# Patient Record
Sex: FEMALE | Race: WHITE | Employment: FULL TIME | URBAN - METROPOLITAN AREA
[De-identification: names, ages, dates, MRNs, and addresses within clinical notes are randomized per-mention and may not be internally consistent; named-entity substitution may affect disease eponyms.]

---

## 2018-08-02 ENCOUNTER — OFFICE VISIT (OUTPATIENT)
Dept: ORTHOPEDIC SURGERY | Age: 23
End: 2018-08-02

## 2018-08-02 VITALS
SYSTOLIC BLOOD PRESSURE: 128 MMHG | BODY MASS INDEX: 29.23 KG/M2 | HEIGHT: 63 IN | DIASTOLIC BLOOD PRESSURE: 81 MMHG | HEART RATE: 68 BPM | WEIGHT: 165 LBS

## 2018-08-02 DIAGNOSIS — L03.011 CELLULITIS OF FINGER OF RIGHT HAND: ICD-10-CM

## 2018-08-02 DIAGNOSIS — M65.331 TRIGGER FINGER, RIGHT MIDDLE FINGER: ICD-10-CM

## 2018-08-02 PROCEDURE — 99214 OFFICE O/P EST MOD 30 MIN: CPT | Performed by: ORTHOPAEDIC SURGERY

## 2018-08-02 RX ORDER — NAPROXEN 500 MG/1
500 TABLET ORAL 2 TIMES DAILY WITH MEALS
Qty: 60 TABLET | Refills: 3 | Status: SHIPPED | OUTPATIENT
Start: 2018-08-02 | End: 2018-10-11 | Stop reason: SDUPTHER

## 2018-08-02 NOTE — PROGRESS NOTES
TESTING:        IMPRESSION AND PLAN: Resolving cellulitis from cat bite infection but what appears to be posttraumatic and post inflammatory trigger digit. We talked about treatment options for this issue, and I certainly do not have a heightened degree of concern for a tendon laceration. However, I have recommended starting simple with an anti-inflammatory to see if this can calm some of the friction within the tendon system. I will plan to see her back in 2-3 weeks if she fails to see improvement, at which point I think would be more reasonable and safe to consider a cortisone injection in this region. Please note that this transcription was created using voice recognition software. Any errors are unintentional and may be due to voice recognition transcription.

## 2018-10-11 RX ORDER — NAPROXEN 500 MG/1
500 TABLET ORAL 2 TIMES DAILY WITH MEALS
Qty: 180 TABLET | Refills: 2 | Status: SHIPPED | OUTPATIENT
Start: 2018-10-11 | End: 2020-12-29

## 2020-12-29 ENCOUNTER — HOSPITAL ENCOUNTER (EMERGENCY)
Age: 25
Discharge: HOME | End: 2020-12-29
Attending: EMERGENCY MEDICINE
Payer: COMMERCIAL

## 2020-12-29 VITALS
DIASTOLIC BLOOD PRESSURE: 78 MMHG | RESPIRATION RATE: 17 BRPM | HEART RATE: 113 BPM | SYSTOLIC BLOOD PRESSURE: 126 MMHG | WEIGHT: 180 LBS | TEMPERATURE: 100.6 F | OXYGEN SATURATION: 98 % | HEIGHT: 63 IN | BODY MASS INDEX: 31.89 KG/M2

## 2020-12-29 PROCEDURE — 6370000000 HC RX 637 (ALT 250 FOR IP): Performed by: EMERGENCY MEDICINE

## 2020-12-29 PROCEDURE — 2580000003 HC RX 258: Performed by: EMERGENCY MEDICINE

## 2020-12-29 PROCEDURE — 99284 EMERGENCY DEPT VISIT MOD MDM: CPT

## 2020-12-29 RX ORDER — LORAZEPAM 1 MG/1
0.5 TABLET ORAL ONCE
Status: COMPLETED | OUTPATIENT
Start: 2020-12-29 | End: 2020-12-29

## 2020-12-29 RX ORDER — 0.9 % SODIUM CHLORIDE 0.9 %
1000 INTRAVENOUS SOLUTION INTRAVENOUS ONCE
Status: COMPLETED | OUTPATIENT
Start: 2020-12-29 | End: 2020-12-29

## 2020-12-29 RX ORDER — IBUPROFEN 400 MG/1
400 TABLET ORAL ONCE
Status: COMPLETED | OUTPATIENT
Start: 2020-12-29 | End: 2020-12-29

## 2020-12-29 RX ADMIN — SODIUM CHLORIDE 1000 ML: 9 INJECTION, SOLUTION INTRAVENOUS at 04:39

## 2020-12-29 RX ADMIN — LORAZEPAM 0.5 MG: 1 TABLET ORAL at 04:41

## 2020-12-29 RX ADMIN — IBUPROFEN 400 MG: 400 TABLET, FILM COATED ORAL at 05:32

## 2020-12-29 ASSESSMENT — PAIN SCALES - GENERAL: PAINLEVEL_OUTOF10: 7

## 2020-12-29 NOTE — ED PROVIDER NOTES
Emergency Department Attending Note    uSha Nunez MD    Date of ED VIsit: 12/29/2020    CHIEF COMPLAINT  Allergic Reaction (Sts \"had the covid vaccine yesterday morning, heart feels like it's pounding out of my chest, fever\")      HISTORY OF PRESENT ILLNESS  Phoenix Faust is a 30 y.o. female  With Vital signs of /80   Pulse 124   Temp 100.6 °F (38.1 °C) (Oral)   Resp 18   Ht 5' 3\" (1.6 m)   Wt 180 lb (81.6 kg)   LMP 12/29/2020 (Exact Date)   SpO2 98%   Breastfeeding No   BMI 31.89 kg/m²  who presents to the ED with a complaint of feeling anxious. Patient seen and evaluated in room 7. Patient got her Moderna vaccine 2 this morning and has pain in her right arm which is expected to be expected there is no redness at the injection site. But she was concerned and got anxious that she was having a reaction to the vaccine and now she feels like her heart is jumping out of her chest and her heart rate when I walked in the room is 104 and as she started talking to me went up to 124 which is telling me that she is probably more anxious about this issue then a reaction to the vaccine. I do not believe this to be an allergic reaction to the vaccine. There is no other rashes no mouth or throat closing. No other complaints, modifying factors or associated symptoms. Patients Past medical history reviewed and listed below  Past Medical History:   Diagnosis Date    C. difficile colitis      History reviewed. No pertinent surgical history. I have reviewed the following from the nursing documentation. History reviewed. No pertinent family history.   Social History     Socioeconomic History    Marital status:      Spouse name: Not on file    Number of children: Not on file    Years of education: Not on file    Highest education level: Not on file   Occupational History    Not on file   Social Needs    Financial resource strain: Not on file    Food insecurity     Worry: Not on file Inability: Not on file    Transportation needs     Medical: Not on file     Non-medical: Not on file   Tobacco Use    Smoking status: Never Smoker    Smokeless tobacco: Never Used   Substance and Sexual Activity    Alcohol use: No    Drug use: No    Sexual activity: Yes     Partners: Male   Lifestyle    Physical activity     Days per week: Not on file     Minutes per session: Not on file    Stress: Not on file   Relationships    Social connections     Talks on phone: Not on file     Gets together: Not on file     Attends Bahai service: Not on file     Active member of club or organization: Not on file     Attends meetings of clubs or organizations: Not on file     Relationship status: Not on file    Intimate partner violence     Fear of current or ex partner: Not on file     Emotionally abused: Not on file     Physically abused: Not on file     Forced sexual activity: Not on file   Other Topics Concern    Not on file   Social History Narrative    Not on file     Current Facility-Administered Medications   Medication Dose Route Frequency Provider Last Rate Last Admin    0.9 % sodium chloride bolus  1,000 mL Intravenous Once Rasta Bourgeois MD        LORazepam (ATIVAN) tablet 0.5 mg  0.5 mg Oral Once Rasta Bourgeois MD         Current Outpatient Medications   Medication Sig Dispense Refill    NUVARING 0.12-0.015 MG/24HR vaginal ring   3    zolpidem (AMBIEN CR) 6.25 MG extended release tablet 12.5 mg.   0     No Known Allergies    REVIEW OF SYSTEMS  10 systems reviewed, pertinent positives per HPI otherwise noted to be negative    PHYSICAL EXAM  /80   Pulse 124   Temp 100.6 °F (38.1 °C) (Oral)   Resp 18   Ht 5' 3\" (1.6 m)   Wt 180 lb (81.6 kg)   LMP 12/29/2020 (Exact Date)   SpO2 98%   Breastfeeding No   BMI 31.89 kg/m²   GENERAL APPEARANCE: Awake and alert. Cooperative. In no obvious distress. HEAD: Normocephalic. Atraumatic. EYES: PERRL. EOM's grossly intact.    ENT: Mucous membranes are pink and moist.   NECK: Supple. HEART: RRR. No murmurs. LUNGS: Respirations unlabored. CTAB. Good air exchange. ABDOMEN: Soft. Non-distended. Non-tender. No masses. No organomegaly. No guarding or rebound. EXTREMITIES: No peripheral edema. Moves all extremities equally. All extremities neurovascularly intact. SKIN: Warm and dry. No acute rashes. NEUROLOGICAL: Alert and oriented. Strength 5/5, sensation intact. Gait normal.   PSYCHIATRIC: Normal mood and affect. No HI or SI expressed to me. RADIOLOGY    If acquired see below     EKG:     If acquired see below       ED COURSE/MDM    Patient will be administered a liter of normal saline as well as half of milligram of Ativan to get her heart rate down and get her anxiety under control. Thereafter she will be discharged to be followed up with her primary care doctor who can then recommend to her whether to get the second shot of the vaccine    ED Course as of Dec 29 0543   Tue Dec 29, 2020   0542 Patient was given Motrin for her slight fever which is probably a response to her Covid vaccine earlier today. [DL]      ED Course User Index  [DL] Dereck Duong MD       The ED course and plan were reviewed and results discussed with the patient    The patient understood and agreed with the Discharge/transfer planning.     CLINICAL IMPRESSION and DISPOSITION    Stephen Cee was stable and diagnosed with anxiety    Patient was treated with normal saline and Ativan       Dereck Duong MD  12/29/20 0497       Dereck Duong MD  12/29/20 6314

## 2021-06-30 LAB
C. TRACHOMATIS, EXTERNAL RESULT: NEGATIVE
N. GONORRHOEAE, EXTERNAL RESULT: NEGATIVE

## 2021-07-16 LAB
ABO, EXTERNAL RESULT: NORMAL
HEP B, EXTERNAL RESULT: NEGATIVE
HEPATITIS C ANTIBODY, EXTERNAL RESULT: NEGATIVE
HIV, EXTERNAL RESULT: NON REACTIVE
RH FACTOR, EXTERNAL RESULT: POSITIVE
RPR, EXTERNAL RESULT: NEGATIVE
RUBELLA TITER, EXTERNAL RESULT: NORMAL

## 2022-01-05 LAB — GBS, EXTERNAL RESULT: NEGATIVE

## 2022-02-04 ENCOUNTER — ANESTHESIA (OUTPATIENT)
Dept: LABOR AND DELIVERY | Age: 27
End: 2022-02-04
Payer: COMMERCIAL

## 2022-02-04 ENCOUNTER — ANESTHESIA EVENT (OUTPATIENT)
Dept: LABOR AND DELIVERY | Age: 27
End: 2022-02-04
Payer: COMMERCIAL

## 2022-02-04 ENCOUNTER — HOSPITAL ENCOUNTER (INPATIENT)
Age: 27
LOS: 2 days | Discharge: HOME | End: 2022-02-06
Attending: OBSTETRICS & GYNECOLOGY | Admitting: OBSTETRICS & GYNECOLOGY
Payer: COMMERCIAL

## 2022-02-04 PROBLEM — Z37.9 NORMAL LABOR: Status: ACTIVE | Noted: 2022-02-04

## 2022-02-04 LAB
A/G RATIO: 1.3 (ref 1.1–2.2)
ABO/RH: NORMAL
ALBUMIN SERPL-MCNC: 3.5 G/DL (ref 3.4–5)
ALP BLD-CCNC: 219 U/L (ref 40–129)
ALT SERPL-CCNC: 14 U/L (ref 10–40)
AMPHETAMINE SCREEN, URINE: NORMAL
ANION GAP SERPL CALCULATED.3IONS-SCNC: 15 MMOL/L (ref 3–16)
ANTIBODY SCREEN: NORMAL
AST SERPL-CCNC: 18 U/L (ref 15–37)
BARBITURATE SCREEN URINE: NORMAL
BASOPHILS ABSOLUTE: 0 K/UL (ref 0–0.2)
BASOPHILS RELATIVE PERCENT: 0.3 %
BENZODIAZEPINE SCREEN, URINE: NORMAL
BILIRUB SERPL-MCNC: <0.2 MG/DL (ref 0–1)
BUN BLDV-MCNC: 13 MG/DL (ref 7–20)
BUPRENORPHINE URINE: NORMAL
CALCIUM SERPL-MCNC: 8.5 MG/DL (ref 8.3–10.6)
CANNABINOID SCREEN URINE: NORMAL
CHLORIDE BLD-SCNC: 103 MMOL/L (ref 99–110)
CO2: 16 MMOL/L (ref 21–32)
COCAINE METABOLITE SCREEN URINE: NORMAL
CREAT SERPL-MCNC: 0.7 MG/DL (ref 0.6–1.1)
CREATININE URINE: 106.1 MG/DL (ref 28–259)
EOSINOPHILS ABSOLUTE: 0.1 K/UL (ref 0–0.6)
EOSINOPHILS RELATIVE PERCENT: 1.1 %
GFR AFRICAN AMERICAN: >60
GFR NON-AFRICAN AMERICAN: >60
GLUCOSE BLD-MCNC: 76 MG/DL (ref 70–99)
HCT VFR BLD CALC: 37.3 % (ref 36–48)
HEMOGLOBIN: 12.5 G/DL (ref 12–16)
LYMPHOCYTES ABSOLUTE: 2.6 K/UL (ref 1–5.1)
LYMPHOCYTES RELATIVE PERCENT: 19.7 %
Lab: NORMAL
MCH RBC QN AUTO: 29.5 PG (ref 26–34)
MCHC RBC AUTO-ENTMCNC: 33.6 G/DL (ref 31–36)
MCV RBC AUTO: 88 FL (ref 80–100)
METHADONE SCREEN, URINE: NORMAL
MONOCYTES ABSOLUTE: 0.7 K/UL (ref 0–1.3)
MONOCYTES RELATIVE PERCENT: 5.2 %
NEUTROPHILS ABSOLUTE: 9.8 K/UL (ref 1.7–7.7)
NEUTROPHILS RELATIVE PERCENT: 73.7 %
OPIATE SCREEN URINE: NORMAL
OXYCODONE URINE: NORMAL
PDW BLD-RTO: 12.9 % (ref 12.4–15.4)
PH UA: 7
PHENCYCLIDINE SCREEN URINE: NORMAL
PLATELET # BLD: 208 K/UL (ref 135–450)
PMV BLD AUTO: 9.8 FL (ref 5–10.5)
POTASSIUM SERPL-SCNC: 4.2 MMOL/L (ref 3.5–5.1)
PROPOXYPHENE SCREEN: NORMAL
PROTEIN PROTEIN: 15 MG/DL
PROTEIN/CREAT RATIO: 0.1 MG/DL
RBC # BLD: 4.24 M/UL (ref 4–5.2)
SODIUM BLD-SCNC: 134 MMOL/L (ref 136–145)
TOTAL PROTEIN: 6.1 G/DL (ref 6.4–8.2)
WBC # BLD: 13.3 K/UL (ref 4–11)

## 2022-02-04 PROCEDURE — 6370000000 HC RX 637 (ALT 250 FOR IP): Performed by: ADVANCED PRACTICE MIDWIFE

## 2022-02-04 PROCEDURE — 7200000001 HC VAGINAL DELIVERY

## 2022-02-04 PROCEDURE — 0HQ9XZZ REPAIR PERINEUM SKIN, EXTERNAL APPROACH: ICD-10-PCS | Performed by: ADVANCED PRACTICE MIDWIFE

## 2022-02-04 PROCEDURE — 85025 COMPLETE CBC W/AUTO DIFF WBC: CPT

## 2022-02-04 PROCEDURE — 3700000025 EPIDURAL BLOCK: Performed by: ANESTHESIOLOGY

## 2022-02-04 PROCEDURE — 1220000000 HC SEMI PRIVATE OB R&B

## 2022-02-04 PROCEDURE — 6370000000 HC RX 637 (ALT 250 FOR IP)

## 2022-02-04 PROCEDURE — 86900 BLOOD TYPING SEROLOGIC ABO: CPT

## 2022-02-04 PROCEDURE — 86592 SYPHILIS TEST NON-TREP QUAL: CPT

## 2022-02-04 PROCEDURE — 80053 COMPREHEN METABOLIC PANEL: CPT

## 2022-02-04 PROCEDURE — 36415 COLL VENOUS BLD VENIPUNCTURE: CPT

## 2022-02-04 PROCEDURE — 51701 INSERT BLADDER CATHETER: CPT

## 2022-02-04 PROCEDURE — 86901 BLOOD TYPING SEROLOGIC RH(D): CPT

## 2022-02-04 PROCEDURE — 86850 RBC ANTIBODY SCREEN: CPT

## 2022-02-04 PROCEDURE — 2500000003 HC RX 250 WO HCPCS: Performed by: NURSE ANESTHETIST, CERTIFIED REGISTERED

## 2022-02-04 PROCEDURE — 80307 DRUG TEST PRSMV CHEM ANLYZR: CPT

## 2022-02-04 PROCEDURE — 6360000002 HC RX W HCPCS: Performed by: ADVANCED PRACTICE MIDWIFE

## 2022-02-04 PROCEDURE — 2580000003 HC RX 258: Performed by: ADVANCED PRACTICE MIDWIFE

## 2022-02-04 PROCEDURE — 82570 ASSAY OF URINE CREATININE: CPT

## 2022-02-04 PROCEDURE — 2580000003 HC RX 258

## 2022-02-04 PROCEDURE — 84156 ASSAY OF PROTEIN URINE: CPT

## 2022-02-04 RX ORDER — LANOLIN 100 %
OINTMENT (GRAM) TOPICAL PRN
Status: DISCONTINUED | OUTPATIENT
Start: 2022-02-04 | End: 2022-02-06 | Stop reason: HOSPADM

## 2022-02-04 RX ORDER — SODIUM CHLORIDE, SODIUM LACTATE, POTASSIUM CHLORIDE, CALCIUM CHLORIDE 600; 310; 30; 20 MG/100ML; MG/100ML; MG/100ML; MG/100ML
INJECTION, SOLUTION INTRAVENOUS CONTINUOUS
Status: DISCONTINUED | OUTPATIENT
Start: 2022-02-04 | End: 2022-02-06 | Stop reason: HOSPADM

## 2022-02-04 RX ORDER — IBUPROFEN 800 MG/1
800 TABLET ORAL EVERY 6 HOURS PRN
Status: DISCONTINUED | OUTPATIENT
Start: 2022-02-05 | End: 2022-02-06 | Stop reason: HOSPADM

## 2022-02-04 RX ORDER — SODIUM CHLORIDE 0.9 % (FLUSH) 0.9 %
5-40 SYRINGE (ML) INJECTION EVERY 12 HOURS SCHEDULED
Status: DISCONTINUED | OUTPATIENT
Start: 2022-02-04 | End: 2022-02-04

## 2022-02-04 RX ORDER — SODIUM CHLORIDE, SODIUM LACTATE, POTASSIUM CHLORIDE, AND CALCIUM CHLORIDE .6; .31; .03; .02 G/100ML; G/100ML; G/100ML; G/100ML
500 INJECTION, SOLUTION INTRAVENOUS PRN
Status: DISCONTINUED | OUTPATIENT
Start: 2022-02-04 | End: 2022-02-04

## 2022-02-04 RX ORDER — HYDROCODONE BITARTRATE AND ACETAMINOPHEN 5; 325 MG/1; MG/1
1 TABLET ORAL EVERY 4 HOURS PRN
Status: DISCONTINUED | OUTPATIENT
Start: 2022-02-04 | End: 2022-02-06 | Stop reason: HOSPADM

## 2022-02-04 RX ORDER — DOCUSATE SODIUM 100 MG/1
100 CAPSULE, LIQUID FILLED ORAL 2 TIMES DAILY
Status: DISCONTINUED | OUTPATIENT
Start: 2022-02-04 | End: 2022-02-06 | Stop reason: HOSPADM

## 2022-02-04 RX ORDER — NITROFURANTOIN 25; 75 MG/1; MG/1
100 CAPSULE ORAL 2 TIMES DAILY
Status: ON HOLD | COMMUNITY
End: 2022-02-05 | Stop reason: HOSPADM

## 2022-02-04 RX ORDER — SODIUM CHLORIDE 0.9 % (FLUSH) 0.9 %
5-40 SYRINGE (ML) INJECTION PRN
Status: DISCONTINUED | OUTPATIENT
Start: 2022-02-04 | End: 2022-02-06 | Stop reason: HOSPADM

## 2022-02-04 RX ORDER — FERROUS SULFATE 325(65) MG
325 TABLET ORAL 2 TIMES DAILY WITH MEALS
Status: DISCONTINUED | OUTPATIENT
Start: 2022-02-04 | End: 2022-02-06 | Stop reason: HOSPADM

## 2022-02-04 RX ORDER — BUPIVACAINE HYDROCHLORIDE 2.5 MG/ML
INJECTION, SOLUTION EPIDURAL; INFILTRATION; INTRACAUDAL PRN
Status: DISCONTINUED | OUTPATIENT
Start: 2022-02-04 | End: 2022-02-04 | Stop reason: SDUPTHER

## 2022-02-04 RX ORDER — SODIUM CHLORIDE 9 MG/ML
25 INJECTION, SOLUTION INTRAVENOUS PRN
Status: DISCONTINUED | OUTPATIENT
Start: 2022-02-04 | End: 2022-02-06 | Stop reason: HOSPADM

## 2022-02-04 RX ORDER — SODIUM CHLORIDE 9 MG/ML
25 INJECTION, SOLUTION INTRAVENOUS PRN
Status: DISCONTINUED | OUTPATIENT
Start: 2022-02-04 | End: 2022-02-04

## 2022-02-04 RX ORDER — SODIUM CHLORIDE, SODIUM LACTATE, POTASSIUM CHLORIDE, CALCIUM CHLORIDE 600; 310; 30; 20 MG/100ML; MG/100ML; MG/100ML; MG/100ML
INJECTION, SOLUTION INTRAVENOUS CONTINUOUS
Status: DISCONTINUED | OUTPATIENT
Start: 2022-02-04 | End: 2022-02-04

## 2022-02-04 RX ORDER — SODIUM CHLORIDE 0.9 % (FLUSH) 0.9 %
5-40 SYRINGE (ML) INJECTION EVERY 12 HOURS SCHEDULED
Status: DISCONTINUED | OUTPATIENT
Start: 2022-02-04 | End: 2022-02-06 | Stop reason: HOSPADM

## 2022-02-04 RX ORDER — CALCIUM CARBONATE 200(500)MG
1 TABLET,CHEWABLE ORAL DAILY
Status: ON HOLD | COMMUNITY
End: 2022-02-05 | Stop reason: HOSPADM

## 2022-02-04 RX ORDER — BUPIVACAINE HYDROCHLORIDE 5 MG/ML
INJECTION, SOLUTION EPIDURAL; INTRACAUDAL PRN
Status: DISCONTINUED | OUTPATIENT
Start: 2022-02-04 | End: 2022-02-04 | Stop reason: SDUPTHER

## 2022-02-04 RX ORDER — ACETAMINOPHEN 325 MG/1
650 TABLET ORAL EVERY 4 HOURS PRN
Status: DISCONTINUED | OUTPATIENT
Start: 2022-02-04 | End: 2022-02-06 | Stop reason: HOSPADM

## 2022-02-04 RX ORDER — SODIUM CHLORIDE, SODIUM LACTATE, POTASSIUM CHLORIDE, AND CALCIUM CHLORIDE .6; .31; .03; .02 G/100ML; G/100ML; G/100ML; G/100ML
1000 INJECTION, SOLUTION INTRAVENOUS PRN
Status: DISCONTINUED | OUTPATIENT
Start: 2022-02-04 | End: 2022-02-04

## 2022-02-04 RX ORDER — SODIUM CHLORIDE 0.9 % (FLUSH) 0.9 %
5-40 SYRINGE (ML) INJECTION PRN
Status: DISCONTINUED | OUTPATIENT
Start: 2022-02-04 | End: 2022-02-04

## 2022-02-04 RX ORDER — ONDANSETRON 2 MG/ML
4 INJECTION INTRAMUSCULAR; INTRAVENOUS EVERY 6 HOURS PRN
Status: DISCONTINUED | OUTPATIENT
Start: 2022-02-04 | End: 2022-02-04

## 2022-02-04 RX ORDER — IBUPROFEN 800 MG/1
TABLET ORAL
Status: COMPLETED
Start: 2022-02-04 | End: 2022-02-04

## 2022-02-04 RX ORDER — FAMOTIDINE 20 MG/1
20 TABLET, FILM COATED ORAL 2 TIMES DAILY
Status: ON HOLD | COMMUNITY
End: 2022-02-05 | Stop reason: HOSPADM

## 2022-02-04 RX ADMIN — Medication 1 MILLI-UNITS/MIN: at 08:15

## 2022-02-04 RX ADMIN — IBUPROFEN 800 MG: 800 TABLET, FILM COATED ORAL at 15:11

## 2022-02-04 RX ADMIN — SODIUM CHLORIDE, POTASSIUM CHLORIDE, SODIUM LACTATE AND CALCIUM CHLORIDE: 600; 310; 30; 20 INJECTION, SOLUTION INTRAVENOUS at 06:01

## 2022-02-04 RX ADMIN — WITCH HAZEL 40 EACH: 500 SOLUTION RECTAL; TOPICAL at 20:57

## 2022-02-04 RX ADMIN — ACETAMINOPHEN 650 MG: 325 TABLET ORAL at 20:57

## 2022-02-04 RX ADMIN — BUPIVACAINE HYDROCHLORIDE 5 ML: 2.5 INJECTION, SOLUTION EPIDURAL; INFILTRATION; INTRACAUDAL; PERINEURAL at 10:05

## 2022-02-04 RX ADMIN — SODIUM CHLORIDE, POTASSIUM CHLORIDE, SODIUM LACTATE AND CALCIUM CHLORIDE: 600; 310; 30; 20 INJECTION, SOLUTION INTRAVENOUS at 12:20

## 2022-02-04 RX ADMIN — Medication 15 ML/HR: at 07:06

## 2022-02-04 RX ADMIN — BENZOCAINE AND LEVOMENTHOL: 200; 5 SPRAY TOPICAL at 20:57

## 2022-02-04 RX ADMIN — BUPIVACAINE HYDROCHLORIDE 5 ML: 5 INJECTION, SOLUTION EPIDURAL; INTRACAUDAL; PERINEURAL at 10:05

## 2022-02-04 RX ADMIN — SODIUM CHLORIDE, PRESERVATIVE FREE 10 ML: 5 INJECTION INTRAVENOUS at 21:05

## 2022-02-04 RX ADMIN — IBUPROFEN 800 MG: 800 TABLET, FILM COATED ORAL at 23:30

## 2022-02-04 RX ADMIN — Medication 10 ML: at 21:05

## 2022-02-04 RX ADMIN — DOCUSATE SODIUM 100 MG: 100 CAPSULE, LIQUID FILLED ORAL at 20:57

## 2022-02-04 RX ADMIN — BUPIVACAINE HYDROCHLORIDE 7 ML: 2.5 INJECTION, SOLUTION EPIDURAL; INFILTRATION; INTRACAUDAL; PERINEURAL at 07:00

## 2022-02-04 ASSESSMENT — PAIN SCALES - GENERAL
PAINLEVEL_OUTOF10: 1
PAINLEVEL_OUTOF10: 3
PAINLEVEL_OUTOF10: 2

## 2022-02-04 ASSESSMENT — PAIN DESCRIPTION - DESCRIPTORS
DESCRIPTORS: CRAMPING
DESCRIPTORS: CRAMPING

## 2022-02-04 NOTE — PROGRESS NOTES
Patient presented to triage by life squad. Patient c/o ROM at 0230 with green fluid. No vaginal bleeding. Patient placed on monitor.

## 2022-02-04 NOTE — L&D DELIVERY NOTE
Department of Obstetrics and Gynecology  Spontaneous Vaginal Delivery Note    Labor & Delivery Summary  Labor Onset Date: 22  Labor Onset Time: 935  Dilation Complete Date: 22  Dilation Complete Time: 935  OB Anesthesia Type: Epidural  Induction:  (na)    Pre-operative Diagnosis:  Term pregnancy, post dates, meconium stained fluid    Post-operative Diagnosis:  Living  infant(s) and Female    Procedure:  Spontaneous vaginal delivery or Repair first degree spontaneous laceration    Surgeon:   Adma Fernández CNM    Information for the patient's :  Edith Sykes Mercury [[id number]]          Anesthesia:  epidural anesthesia    Estimated blood loss:  100 cc    Specimen:  Placenta not sent to pathology     Cord blood sent No    Complications:  none    Condition:  infant Stable and skin to skin with the mother and mother stable    Details of Procedure: The patient is a 27 y.o. female at 39w6d   OB History        1    Para   0    Term   0       0    AB   0    Living   0       SAB   0    IAB   0    Ectopic   0    Molar   0    Multiple   0    Live Births   0             who was admitted for spontaneous rupture of membranes/early labor. She received the following interventions: IV Pitocin augmentation at 2 miu x 1 hour. She was known to be GBS negative and did not receive antibiotic prophylaxis. The patient progressed well,did receive an epidural, became complete and started to push. After pushing for 20 minutes the fetal head was at the perineum and the rest of the infant delivered atraumatically and was placed on the mother's abdomen. The NNP was present for delivery. The infant established good color, tone and cry with stimulation. Cord was clamped and cut and infant was placed skin to skin with the mother. Apgar scores were 7-9. The delivery of the placenta was spontaneous. The perineum and vagina were explored and a first degree laceration was repaired in standard fashion.  This mother plans to bottlefeed her baby. Dr Nicky Powell was notified of this birth.

## 2022-02-04 NOTE — PROGRESS NOTES
This RN reviewed all documentation and supervised all care done by Konstantin Aleman, Student nurse.   Gay Cullen RN

## 2022-02-04 NOTE — ANESTHESIA PRE PROCEDURE
Department of Anesthesiology  Preprocedure Note       Name:  Sharona Ramos   Age:  27 y.o.  :  1995                                          MRN:  [id number]         Date:  2022      Surgeon: * No surgeons listed *    Procedure: * No procedures listed *    Medications prior to admission:   Prior to Admission medications    Medication Sig Start Date End Date Taking?  Authorizing Provider   hydrOXYzine Pamoate (VISTARIL PO) Take 25 mg by mouth 3 times daily as needed (for anxiety)   Yes Historical Provider, MD   nitrofurantoin, macrocrystal-monohydrate, (MACROBID) 100 MG capsule Take 100 mg by mouth 2 times daily   Yes Historical Provider, MD   calcium carbonate (TUMS) 500 MG chewable tablet Take 1 tablet by mouth daily   Yes Historical Provider, MD   Prenatal MV-Min-Fe Fum-FA-DHA (PRENATAL 1 PO) Take by mouth   Yes Historical Provider, MD   famotidine (PEPCID) 20 MG tablet Take 20 mg by mouth 2 times daily   Yes Historical Provider, MD   docusate (COLACE) 50 MG/5ML liquid Take 50 mg by mouth 2 times daily   Yes Historical Provider, MD       Current medications:    Current Facility-Administered Medications   Medication Dose Route Frequency Provider Last Rate Last Admin    lactated ringers infusion   IntraVENous Continuous Herlinda Paul, APRN -  mL/hr at 22 0601 New Bag at 22 0601    lactated ringers bolus  500 mL IntraVENous PRN Herlinda Paul, APRN - CNM        Or    lactated ringers bolus  1,000 mL IntraVENous PRN Herlinda Paul, APRN - CNM        sodium chloride flush 0.9 % injection 5-40 mL  5-40 mL IntraVENous 2 times per day Herlinda Paul, APRN - CNM        sodium chloride flush 0.9 % injection 5-40 mL  5-40 mL IntraVENous PRN Herlinda Paul, APRN - CNM        0.9 % sodium chloride infusion  25 mL IntraVENous PRN Herlinda Paul, APRN - CNM        ondansetron [facility] injection 4 mg  4 mg IntraVENous Q6H PRN Herlinda Paul, APRN - CNM        acetaminophen (TYLENOL) tablet 650 mg  650 mg Oral Q4H PRN Oral Conine, APRN - CNM        docusate sodium (COLACE) capsule 100 mg  100 mg Oral BID Oral Conine, APRN - CNM        sodium chloride 0.9 % 200 mL with fentaNYL 500 mcg, bupivacaine 0.5% 50 mL (OB) epidural                Allergies:  No Known Allergies    Problem List:    Patient Active Problem List   Diagnosis Code    Right wrist pain M25.531    Ganglion, right wrist M67.431    Cellulitis of finger of right hand L03.011    Trigger finger, right middle finger M65.331    Normal labor O80, Z37.9       Past Medical History:        Diagnosis Date    C. difficile colitis     Mental disorder     anxiety       Past Surgical History:  History reviewed. No pertinent surgical history. Social History:    Social History     Tobacco Use    Smoking status: Never Smoker    Smokeless tobacco: Never Used   Substance Use Topics    Alcohol use: No                                Counseling given: Not Answered      Vital Signs (Current):   Vitals:    02/04/22 0421 02/04/22 0459 02/04/22 0516 02/04/22 0600   BP: (!) 153/94 (!) 145/90 (!) 145/94 (!) 143/81   Pulse: 71 75 71 74   Resp:  16 18 20   Temp:    36.8 °C (98.3 °F)   TempSrc:    Oral   Weight:       Height:                                                  BP Readings from Last 3 Encounters:   02/04/22 (!) 143/81   12/29/20 126/78   08/02/18 128/81       NPO Status:                                                                                 BMI:   Wt Readings from Last 3 Encounters:   02/04/22 242 lb (109.8 kg)   12/29/20 180 lb (81.6 kg)   08/02/18 165 lb (74.8 kg)     Body mass index is 41.54 kg/m².     CBC:   Lab Results   Component Value Date    WBC 13.3 02/04/2022    RBC 4.24 02/04/2022    HGB 12.5 02/04/2022    HCT 37.3 02/04/2022    MCV 88.0 02/04/2022    RDW 12.9 02/04/2022     02/04/2022       CMP: No results found for: NA, K, CL, CO2, BUN, CREATININE, GFRAA, AGRATIO, LABGLOM, GLUCOSE, GLU, PROT, CALCIUM, BILITOT, ALKPHOS, AST, ALT    POC Tests: No results for input(s): POCGLU, POCNA, POCK, POCCL, POCBUN, POCHEMO, POCHCT in the last 72 hours. Coags: No results found for: PROTIME, INR, APTT    HCG (If Applicable): No results found for: PREGTESTUR, PREGSERUM, HCG, HCGQUANT     ABGs: No results found for: PHART, PO2ART, CJN1YSJ, SMU6NVI, BEART, S7FOGUFS     Type & Screen (If Applicable):  No results found for: LABABO, LABRH    Drug/Infectious Status (If Applicable):  No results found for: HIV, HEPCAB    COVID-19 Screening (If Applicable): No results found for: COVID19        Anesthesia Evaluation  Patient summary reviewed and Nursing notes reviewed no history of anesthetic complications:   Airway: Mallampati: II     Neck ROM: full   Dental: normal exam         Pulmonary:Negative Pulmonary ROS and normal exam                               Cardiovascular:Negative CV ROS                      Neuro/Psych:   Negative Neuro/Psych ROS  (+) depression/anxiety             GI/Hepatic/Renal: Neg GI/Hepatic/Renal ROS  (+) morbid obesity          Endo/Other: Negative Endo/Other ROS                    Abdominal:             Vascular: Other Findings:             Anesthesia Plan      epidural     ASA 2 - emergent     (I discussed with the patient the risks and benefits of labor epidural placement. All questions were answered the patient agrees with the plan. Recent Vitals:  ---------------------------               02/04/22                      0600         ---------------------------   BP:        (!) 143/81       Pulse:         74           Resp:          20           Temp:   36.8 °C (98.3 °F)  ---------------------------  Body mass index is 41.54 kg/m².     Social History    Tobacco Use      Smoking status: Never Smoker      Smokeless tobacco: Never Used      LABS:    CBC  Lab Results       Component                Value               Date/Time                  WBC                      13.3 (H) 02/04/2022 04:53 AM        HGB                      12.5                02/04/2022 04:53 AM        HCT                      37.3                02/04/2022 04:53 AM        PLT                      208                 02/04/2022 04:53 AM   RENAL  No results found for: NA, K, CL, CO2, BUN, CREATININE, GLUCOSE  COAGS  No results found for: PROTIME, INR, APTT)        Anesthetic plan and risks discussed with patient.                       WARREN Tyson - CRNA   2/4/2022

## 2022-02-04 NOTE — PROGRESS NOTES
Dr. Warden Wolf notified of patient arrival and that she is grossly ruptured.  Pt to stay for admission

## 2022-02-04 NOTE — PROGRESS NOTES
First time get up done; the pt tolerated well without any dizziness/lightheadedness, gait steady. Pt was able to void 600 ml without difficulty. Pt ambulated back to bed independently. No needs at this time, will continue to monitor.

## 2022-02-04 NOTE — H&P
Department of Obstetrics and Gynecology   Obstetrics History and Physical        CHIEF COMPLAINT:  leakage of amniotic fluid    HISTORY OF PRESENT ILLNESS:      The patient is a 27 y.o. female at 39w6d. OB History        1    Para   0    Term   0       0    AB   0    Living   0       SAB   0    IAB   0    Ectopic   0    Molar   0    Multiple   0    Live Births   0            Patient presents with a chief complaint as above and is being admitted for ROM. States ROM @ 0230 that was \"green. \" Called squad for transport to hospital d/t \"green fluid and weather. \" Was supposed to deliver at [address], but squad brought her to [facility]. Feeling irregular ctx. Denies VB, states +FM. States US @ 20 wks was normal.    Estimated Due Date: Estimated Date of Delivery: 22    PRENATAL CARE:    Complicated by: anxiety, Covid + 21, UTI - Macrobid    PAST OB HISTORY:  OB History        1    Para   0    Term   0       0    AB   0    Living   0       SAB   0    IAB   0    Ectopic   0    Molar   0    Multiple   0    Live Births   0                Past Medical History:        Diagnosis Date    C. difficile colitis     Mental disorder     anxiety     Past Surgical History:    History reviewed. No pertinent surgical history. Allergies:  Patient has no known allergies.     Social History:    Social History     Socioeconomic History    Marital status:      Spouse name: Not on file    Number of children: Not on file    Years of education: Not on file    Highest education level: Not on file   Occupational History    Not on file   Tobacco Use    Smoking status: Never Smoker    Smokeless tobacco: Never Used   Substance and Sexual Activity    Alcohol use: No    Drug use: No    Sexual activity: Yes     Partners: Male   Other Topics Concern    Not on file   Social History Narrative    Not on file     Social Determinants of Health     Financial Resource Strain:     Difficulty of Paying Living Expenses: Not on file   Food Insecurity:     Worried About Running Out of Food in the Last Year: Not on file    [place] of Food in the Last Year: Not on file   Transportation Needs:     Lack of Transportation (Medical): Not on file    Lack of Transportation (Non-Medical): Not on file   Physical Activity:     Days of Exercise per Week: Not on file    Minutes of Exercise per Session: Not on file   Stress:     Feeling of Stress : Not on file   Social Connections:     Frequency of Communication with Friends and Family: Not on file    Frequency of Social Gatherings with Friends and Family: Not on file    Attends Mormonism Services: Not on file    Active Member of [address] [place] [address] [employer] or Organizations: Not on file    Attends Club or Organization Meetings: Not on file    Marital Status: Not on file   Intimate Partner Violence:     Fear of Current or Ex-Partner: Not on file    Emotionally Abused: Not on file    Physically Abused: Not on file    Sexually Abused: Not on file   Housing Stability:     Unable to Pay for Housing in the Last Year: Not on file    Number of [place] in the Last Year: Not on file    Unstable Housing in the Last Year: Not on file     Family History:   History reviewed. No pertinent family history.   Medications Prior to Admission:  Medications Prior to Admission: hydrOXYzine Pamoate (VISTARIL PO), Take 25 mg by mouth 3 times daily as needed (for anxiety)  nitrofurantoin, macrocrystal-monohydrate, (MACROBID) 100 MG capsule, Take 100 mg by mouth 2 times daily  calcium carbonate (TUMS) 500 MG chewable tablet, Take 1 tablet by mouth daily  Prenatal MV-Min-Fe Fum-FA-DHA (PRENATAL 1 PO), Take by mouth  famotidine (PEPCID) 20 MG tablet, Take 20 mg by mouth 2 times daily  docusate (COLACE) 50 MG/5ML liquid, Take 50 mg by mouth 2 times daily  [DISCONTINUED] NUVARING 0.12-0.015 MG/24HR vaginal ring,   [DISCONTINUED] zolpidem (AMBIEN CR) 6.25 MG extended release tablet, 12.5 mg.     REVIEW OF SYSTEMS:    Review of Systems - Negative except irregular ctx      PHYSICAL EXAM:  Vitals:    22 0410 22 0421   BP: (!) 142/92 (!) 153/94   Pulse: 87 71   Resp: 18    Temp: 98 °F (36.7 °C)    TempSrc: Oral    Weight: 242 lb (109.8 kg)    Height: 5' 4\" (1.626 m)      General appearance: awake, alert, cooperative, no apparent distress, and appears stated age  Neurologic:  Awake, alert, oriented to name, place and time.     Lungs:  No increased work of breathing, good air exchange  Abdomen:  Soft, non tender, gravid, consistent with her gestational age, EFW by Leopold's maneuver was 8lbs   Fetal heart rate: 130, moderate variability w/ periods of minimal, +accels, no decels  Pelvis:  Adequate pelvis  Cervix: 1/80/-1 per RN @   Contraction frequency:  q4-6min  Membranes:  Ruptured - meconium stained    Labs: pending    ASSESSMENT AND PLAN:    27 y.o.  @ 40w5d for SROM and irregular ctx  Elevated bps on admission - asymptomatic, CMP to be collected, PCR w/ straight cath after epidural  GHTN vs anxiety  GBS negative per Care Everywhere  FHT: Cat 1  Dr. Kelle Hoyt will be notified of this admission  Continue to monitor  Anticipate vaginal delivery

## 2022-02-04 NOTE — PLAN OF CARE
Problem: Pain:  Description: Pain management should include both nonpharmacologic and pharmacologic interventions.   Goal: Pain level will decrease  Description: Pain level will decrease  Outcome: Completed  Goal: Control of acute pain  Description: Control of acute pain  Outcome: Completed  Goal: Control of chronic pain  Description: Control of chronic pain  Outcome: Completed     Problem: Anxiety:  Goal: Level of anxiety will decrease  Description: Level of anxiety will decrease  Outcome: Completed     Problem: Breathing Pattern - Ineffective:  Goal: Able to breathe comfortably  Description: Able to breathe comfortably  Outcome: Completed     Problem: Fluid Volume - Imbalance:  Goal: Absence of imbalanced fluid volume signs and symptoms  Description: Absence of imbalanced fluid volume signs and symptoms  Outcome: Completed  Goal: Absence of intrapartum hemorrhage signs and symptoms  Description: Absence of intrapartum hemorrhage signs and symptoms  Outcome: Completed     Problem: Infection - Intrapartum Infection:  Goal: Will show no infection signs and symptoms  Description: Will show no infection signs and symptoms  Outcome: Completed     Problem: Labor Process - Prolonged:  Goal: Labor progression, first stage, within specified pattern  Description: Labor progression, first stage, within specified pattern  Outcome: Completed  Goal: Labor progession, second stage, within specified pattern  Description: Labor progession, second stage, within specified pattern  Outcome: Completed  Goal: Uterine contractions within specified parameters  Description: Uterine contractions within specified parameters  Outcome: Completed     Problem:  Screening:  Goal: Ability to make informed decisions regarding treatment has improved  Description: Ability to make informed decisions regarding treatment has improved  Outcome: Completed     Problem: Pain - Acute:  Goal: Able to cope with pain  Description: Able to cope with pain  Outcome: Completed     Problem: Tissue Perfusion - Uteroplacental, Altered:  Description: [TRUNCATED] For intrapartum patients with recurrent variable decelerations of the fetal heart rate, consider transcervical amnioinfusion. For patients in labor, avoid prophylactic use of continuous maternal oxygen supplementation to prevent nonreassu . ..   Goal: Absence of abnormal fetal heart rate pattern  Description: Absence of abnormal fetal heart rate pattern  Outcome: Completed     Problem: Urinary Retention:  Goal: Experiences of bladder distention will decrease  Description: Experiences of bladder distention will decrease  Outcome: Completed  Goal: Urinary elimination within specified parameters  Description: Urinary elimination within specified parameters  Outcome: Completed

## 2022-02-05 PROBLEM — Z37.9 NORMAL LABOR: Status: ACTIVE | Noted: 2022-02-05

## 2022-02-05 LAB
HCT VFR BLD CALC: 33.2 % (ref 36–48)
HEMOGLOBIN: 11 G/DL (ref 12–16)
MCH RBC QN AUTO: 29.7 PG (ref 26–34)
MCHC RBC AUTO-ENTMCNC: 33.2 G/DL (ref 31–36)
MCV RBC AUTO: 89.5 FL (ref 80–100)
PDW BLD-RTO: 13 % (ref 12.4–15.4)
PLATELET # BLD: 168 K/UL (ref 135–450)
PMV BLD AUTO: 9.5 FL (ref 5–10.5)
RBC # BLD: 3.71 M/UL (ref 4–5.2)
RPR: NORMAL
WBC # BLD: 14.8 K/UL (ref 4–11)

## 2022-02-05 PROCEDURE — 6370000000 HC RX 637 (ALT 250 FOR IP): Performed by: OBSTETRICS & GYNECOLOGY

## 2022-02-05 PROCEDURE — 36415 COLL VENOUS BLD VENIPUNCTURE: CPT

## 2022-02-05 PROCEDURE — 6370000000 HC RX 637 (ALT 250 FOR IP): Performed by: ADVANCED PRACTICE MIDWIFE

## 2022-02-05 PROCEDURE — 85027 COMPLETE CBC AUTOMATED: CPT

## 2022-02-05 PROCEDURE — 6370000000 HC RX 637 (ALT 250 FOR IP)

## 2022-02-05 PROCEDURE — 1220000000 HC SEMI PRIVATE OB R&B

## 2022-02-05 RX ORDER — POLYETHYLENE GLYCOL 3350 17 G/17G
17 POWDER, FOR SOLUTION ORAL
Status: ACTIVE | OUTPATIENT
Start: 2022-02-05 | End: 2022-02-05

## 2022-02-05 RX ORDER — BISACODYL 10 MG
10 SUPPOSITORY, RECTAL RECTAL
Status: COMPLETED | OUTPATIENT
Start: 2022-02-05 | End: 2022-02-05

## 2022-02-05 RX ADMIN — DOCUSATE SODIUM 100 MG: 100 CAPSULE, LIQUID FILLED ORAL at 22:05

## 2022-02-05 RX ADMIN — DOCUSATE SODIUM 100 MG: 100 CAPSULE, LIQUID FILLED ORAL at 08:55

## 2022-02-05 RX ADMIN — BENZOCAINE AND LEVOMENTHOL: 200; 5 SPRAY TOPICAL at 08:55

## 2022-02-05 RX ADMIN — ACETAMINOPHEN 650 MG: 325 TABLET ORAL at 01:18

## 2022-02-05 RX ADMIN — IBUPROFEN 800 MG: 800 TABLET, FILM COATED ORAL at 13:33

## 2022-02-05 RX ADMIN — IBUPROFEN 800 MG: 800 TABLET, FILM COATED ORAL at 22:05

## 2022-02-05 RX ADMIN — WITCH HAZEL 40 EACH: 500 SOLUTION RECTAL; TOPICAL at 08:55

## 2022-02-05 RX ADMIN — BISACODYL 10 MG: 10 SUPPOSITORY RECTAL at 16:10

## 2022-02-05 RX ADMIN — IBUPROFEN 800 MG: 800 TABLET, FILM COATED ORAL at 05:32

## 2022-02-05 ASSESSMENT — PAIN SCALES - GENERAL
PAINLEVEL_OUTOF10: 4
PAINLEVEL_OUTOF10: 4
PAINLEVEL_OUTOF10: 0
PAINLEVEL_OUTOF10: 1

## 2022-02-05 NOTE — L&D DELIVERY SUMMARY NOTE
[address] [address]                            LABOR AND DELIVERY NOTE    PATIENT NAME: Otoniel Avila                        :        1995  MED REC NO:   [id number]                          ROOM:       [id number]  ACCOUNT NO:   [de-identified]                           ADMIT DATE: 2022  PROVIDER:     Vivek Duron CNM    DATE OF PROCEDURE:  2022    DELIVERY SUMMARY    The patient is a 68-year-old  1, para 0 at 40 weeks and 5 days  gestation, who was admitted on 2022 with spontaneous rupture of  membranes at 40 weeks and 5 days. She was intending to deliver at  [facility]. However, with the rupture of membranes and icy road,  she called for an ambulance to transport her to the hospital and they  brought her to the closest hospital, which was [facility]. On  admission, she was grossly ruptured with meconium-stained fluid. Fetal  heart tones were category I. She was 1 cm dilated, 80% effaced, -1  station with a cephalic presentation. Fetal heart tones were category  I.  Her pregnancy course was positive for COVID infection on 2021  and then she did have a history of recurrent urinary tract infections  and she was on Macrobid suppression. She does also have a history of  anxiety, but is not currently taking medications. She became  uncomfortable, requested an epidural with good relief. Made good labor  progress. She had a brief augmentation with Pitocin at 2 milliunits for  1 hour. Her baby had some repetitive late decelerations with category  II for brief amount of time and the Pitocin was turned off. She  continued to make good labor progress. Became complete and pushed for  approximately 20 minutes and at 12:44 on 2022, she had a  spontaneous vaginal delivery of a liveborn infant female. The   nurse practitioner was present for delivery.   The infant established  good color, tone, and cry with stimulation. There was thick meconium  fluid noted at delivery. The cord was doubly clamped and cut, and the  infant was placed skin-to-skin with the mother. Apgar scores were 7 and  9. The placenta delivered spontaneously and was inspected and found to  be intact. The uterus contracted promptly and the patient did receive  oxytocin and the IV fluids. Estimated blood loss was 100 mL. Upon  inspection of perineum and vagina, she was found have a first-degree  laceration and this was repaired with 3-0 suture under the existing  epidural anesthesia in the usual fashion. Mother, baby, and father  entered into postpartum period in excellent condition. This mother  plans to bottle feed her baby and Dr. Vale Greenfield was notified of this birth.         Deja Barrios CNM    D: 2022 13:46:04       T: 2022 23:19:45     [email or web address]  Job#: [id number]     Doc#: [id number]    CC:

## 2022-02-05 NOTE — PROGRESS NOTES
Department of Obstetrics and Gynecology  Labor and Delivery  CNM Post Partum Progress Note      SUBJECTIVE:  Doing well, tolerating reg diet, voiding, ambulating, bottle-feeding. Ready for home. Pain controlled with po pain meds.      OBJECTIVE:      Vitals:  /75   Pulse 80   Temp 98.2 °F (36.8 °C) (Oral)   Resp 14   Ht 5' 4\" (1.626 m)   Wt 242 lb (109.8 kg)   Breastfeeding Unknown   BMI 41.54 kg/m²     abd-soft, NT  [email or web address]  Lochia, small rubra  Perineum-neg edema, sutures intact    DATA:    CBC:    Lab Results   Component Value Date    WBC 14.8 2022    RBC 3.71 2022    HGB 11.0 2022    HCT 33.2 2022    MCV 89.5 2022    RDW 13.0 2022     2022       ASSESSMENT:  Primip s/p   PP day 1  Stable female   Bottle-feeding         PLAN:    D/c to home  Fu office 6 weeks    Active Problems:    Vaginal delivery  Plan:

## 2022-02-05 NOTE — ANESTHESIA POSTPROCEDURE EVALUATION
Department of Anesthesiology  Postprocedure Note    Patient: Ana Pires  MRN: [id number]  YOB: 1995  Date of evaluation: 2/5/2022  Time:  8:40 AM     Procedure Summary     Date: 02/04/22 Room / Location:     Anesthesia Start: 0645 Anesthesia Stop: 1244    Procedure: Labor Analgesia Diagnosis:     Scheduled Providers:  Responsible Provider: Adam Burton MD    Anesthesia Type: epidural ASA Status: 2 - Emergent          Anesthesia Type: epidural    Angela Phase I: Angela Score: 10    Angela Phase II: Angela Score: 10    Last vitals: Reviewed and per EMR flowsheets.        Anesthesia Post Evaluation    Patient location during evaluation: bedside  Patient participation: complete - patient participated  Level of consciousness: awake and alert  Airway patency: patent  Nausea & Vomiting: no nausea and no vomiting  Complications: no  Cardiovascular status: hemodynamically stable  Respiratory status: acceptable  Hydration status: stable

## 2022-02-05 NOTE — PROGRESS NOTES
Discharge order cancelled due to infant not feeding adequately for age. All caregivers and parents of baby in agreement that patient and father should work on doing infant cares overnight and improving on infant's feeding volumes.

## 2022-02-05 NOTE — DISCHARGE SUMMARY
Obstetric Discharge Summary    Admitting Diagnosis  IUP 40.5 weeks  OB History        1    Para   1    Term   1       0    AB   0    Living   1       SAB   0    IAB   0    Ectopic   0    Molar   0    Multiple   0    Live Births   1                Reasons for Admission on 2022  3:57 AM  Normal labor [O80, Z37.9]  No comment available  Onset of Labor    Prenatal Procedures  See ACOG    Intrapartum Procedures                 Spontaneous Vaginal Delivery: See Labor and Delivery Summary       Postpartum Procedures  None    Postpartum/Operative Complications        Data  Information for the patient's :  Philip Santizo [[id number]]   female   Birth Weight: 6 lb 6.8 oz (2.914 kg)     Discharge With Mother  Complications: No    Discharge Diagnosis   Primip s/p -stable     Discharge Information  Current Discharge Medication List      CONTINUE these medications which have NOT CHANGED    Details   hydrOXYzine Pamoate (VISTARIL PO) Take 25 mg by mouth 3 times daily as needed (for anxiety)      nitrofurantoin, macrocrystal-monohydrate, (MACROBID) 100 MG capsule Take 100 mg by mouth 2 times daily      calcium carbonate (TUMS) 500 MG chewable tablet Take 1 tablet by mouth daily      Prenatal MV-Min-Fe Fum-FA-DHA (PRENATAL 1 PO) Take by mouth      famotidine (PEPCID) 20 MG tablet Take 20 mg by mouth 2 times daily      docusate (COLACE) 50 MG/5ML liquid Take 50 mg by mouth 2 times daily         STOP taking these medications       NUVARING 0.12-0.015 MG/24HR vaginal ring Comments:   Reason for Stopping:         zolpidem (AMBIEN CR) 6.25 MG extended release tablet Comments:   Reason for Stopping:               No discharge procedures on file. Discharge to: Home  Follow up in 6 weeks at [facility]. Accompanied by .     Discharge Date: 22 Time:       Comments

## 2022-02-05 NOTE — PROGRESS NOTES
Discharge Phone Call    Patient Name: Michelle Martinez     [place] Care Provider: Rosibel Rudd MD Discharge Date: 2022    Disposition of baby:    Phone Number: [phone] (home)     Attempts to Contact:  Date:    Caller  Date:    Caller  Date:    Caller    Information for the patient's :  Ruby Soto [[id number]]   Delivery Method: Vaginal, Spontaneous       1. Now that you are at home is your pain being well controlled? Y/N   If no, instruct to call       provider. 2. Are you breastfeeding? Y/N    Do you need any extra support from our lactation staff? Y/N    If yes, provide number for lactation. 3. Have you made or already had your first appointment with the baby's doctor? Y/N   If no, do      you know when to schedule it? Y/N    4. Have you scheduled your follow-up appointment? Y/N  If no, do you know when to schedule       it? Y/N   If no, they can find it on printed discharge instructions. 5. Did staff discuss safe sleep during your stay? Y/N   6. Did we explain things in a way you could understand? Y/N  7. Were we respectful of your preferences for labor and birth and include you in the plan of       care? Y/N  If no, please explain _______________________________________________  8. Is there anyone in particular you would like to mention who provided care for you? _______      _________________________________________________________________________     9. Were you given a Post-Birth Warning Signs handout? Y/N  Do you have it somewhere      easily accessible? Y/N  If no, please send them a copy and ask them to put it somewhere      easily found. 10. Have you been crying excessively, having anger or mood swings that feel out of control, or       feel like you can't cope with caring for yourself or baby? Y/N   If yes, they may be showing       signs of postpartum depression and should call provider.  There is also a        depression test on page C5 in their discharge booklet they can take. 13. Do you have any other questions or concerns I can address today?  Y/N  ______________      _________________________________________________________________________    Information provided during call :_________________________________________________  ___________________________________________________________________________    Call completed by:____________________________    Date:_________ Time:___________

## 2022-02-06 VITALS
RESPIRATION RATE: 18 BRPM | WEIGHT: 242 LBS | BODY MASS INDEX: 41.32 KG/M2 | TEMPERATURE: 98.4 F | HEIGHT: 64 IN | SYSTOLIC BLOOD PRESSURE: 141 MMHG | DIASTOLIC BLOOD PRESSURE: 85 MMHG | HEART RATE: 65 BPM

## 2022-02-06 PROCEDURE — 6370000000 HC RX 637 (ALT 250 FOR IP)

## 2022-02-06 PROCEDURE — 6370000000 HC RX 637 (ALT 250 FOR IP): Performed by: ADVANCED PRACTICE MIDWIFE

## 2022-02-06 RX ADMIN — IBUPROFEN 800 MG: 800 TABLET, FILM COATED ORAL at 04:07

## 2022-02-06 RX ADMIN — DOCUSATE SODIUM 100 MG: 100 CAPSULE, LIQUID FILLED ORAL at 09:20

## 2022-02-06 RX ADMIN — ACETAMINOPHEN 650 MG: 325 TABLET ORAL at 09:20

## 2022-02-06 ASSESSMENT — PAIN SCALES - GENERAL
PAINLEVEL_OUTOF10: 3
PAINLEVEL_OUTOF10: 4

## 2022-02-06 NOTE — PLAN OF CARE
Problem: VAGINAL DELIVERY - RECOVERY AND POST PARTUM  Goal: Vital signs are medically acceptable  2/5/2022 1951 by Marissa Bernard RN  Outcome: Ongoing  2/5/2022 0827 by Heidi Richards RN  Outcome: Ongoing  Goal: Patient will remain free of falls  2/5/2022 1951 by Marissa Bernard RN  Outcome: Ongoing  2/5/2022 0827 by Heidi Richards RN  Outcome: Ongoing  Goal: Fundus firm at midline  2/5/2022 1951 by Marissa Bernard RN  Outcome: Ongoing  2/5/2022 0827 by Heidi Richards RN  Outcome: Ongoing  Goal: Moderate rubra without clots, no purulent discharge, no foul smelling lochia  2/5/2022 1951 by Marissa Bernard RN  Outcome: Ongoing  2/5/2022 0827 by Heidi Richards RN  Outcome: Ongoing  Goal: Empties bladder  2/5/2022 1951 by Marissa Bernard RN  Outcome: Ongoing  2/5/2022 0827 by Heidi Richards RN  Outcome: Ongoing  Goal: Verbalizes understanding of normal bowel function resumption  2/5/2022 1951 by Marissa Bernard RN  Outcome: Ongoing  2/5/2022 0827 by Heidi Richards RN  Outcome: Ongoing  Goal: Edema will be absent or minimal  2/5/2022 1951 by Marissa Bernard RN  Outcome: Ongoing  2/5/2022 0827 by Heidi Richards RN  Outcome: Ongoing  Goal: Breasts are soft with nipple integrity intact  2/5/2022 1951 by Marissa Bernard RN  Outcome: Ongoing  2/5/2022 0827 by Heidi Richards RN  Outcome: Ongoing  Goal: Demonstrates appropriate breast feeding techniques  2/5/2022 1951 by Marissa Bernard RN  Outcome: Ongoing  2/5/2022 0827 by Heidi Richards RN  Outcome: Ongoing  Goal: Appropriate behavior observed  2/5/2022 1951 by Marissa Bernard RN  Outcome: Ongoing  2/5/2022 0827 by Heidi Richards RN  Outcome: Ongoing  Goal: Positive Mother-Baby interactions are observed  2/5/2022 1951 by Marissa Bernard RN  Outcome: Ongoing  2/5/2022 0827 by Heidi Richards RN  Outcome: Ongoing  Goal: Perineum intact without discharge or hematoma  2/5/2022 1951 by Marissa Bernard RN  Outcome: Ongoing  2/5/2022 0827 by Heidi Richards RN  Outcome: Ongoing  Goal: Ambulates independently  2/5/2022 1951 by Marissa Bernard, RN  Outcome: Ongoing  2/5/2022 0827 by Ghazal Day RN  Outcome: Ongoing     Problem: PAIN  Goal: Patient's pain/discomfort is manageable  2/5/2022 1951 by Jordana Braxton RN  Outcome: Ongoing  2/5/2022 0827 by Ghazal Day RN  Outcome: Ongoing     Problem: KNOWLEDGE DEFICIT  Goal: Patient/S.O. demonstrates understanding of disease process, treatment plan, medications, and discharge instructions.   2/5/2022 1951 by Jordana Braxton RN  Outcome: Ongoing  2/5/2022 0827 by Ghazal Day RN  Outcome: Ongoing

## 2022-02-06 NOTE — FLOWSHEET NOTE
Discharge Phone Call    Patient Name: Adalberto Bey     [place] Care Provider: Jigar Vega MD Discharge Date: 2022    Disposition of baby:    Phone Number: [phone] (home)     Attempts to Contact:  Date:    Caller  Date:    Caller  Date:    Caller    Information for the patient's :  Spencer Hair [[id number]]   Delivery Method: Vaginal, Spontaneous       1. Now that you are at home is your pain being well controlled? Y/N   If no, instruct to call       provider. 2. Are you breastfeeding? Y/N    Do you need any extra support from our lactation staff? Y/N    If yes, provide number for lactation. 3. Have you made or already had your first appointment with the baby's doctor? Y/N   If no, do      you know when to schedule it? Y/N    4. Have you scheduled your follow-up appointment? Y/N  If no, do you know when to schedule       it? Y/N   If no, they can find it on printed discharge instructions. 5. Did staff discuss safe sleep during your stay? Y/N   6. Did we explain things in a way you could understand? Y/N  7. Were we respectful of your preferences for labor and birth and include you in the plan of       care? Y/N  If no, please explain _______________________________________________  8. Is there anyone in particular you would like to mention who provided care for you? _______      _________________________________________________________________________     9. Were you given a Post-Birth Warning Signs handout? Y/N  Do you have it somewhere      easily accessible? Y/N  If no, please send them a copy and ask them to put it somewhere      easily found. 10. Have you been crying excessively, having anger or mood swings that feel out of control, or       feel like you can't cope with caring for yourself or baby? Y/N   If yes, they may be showing       signs of postpartum depression and should call provider.  There is also a        depression test on page C5 in their discharge booklet they can take. 13. Do you have any other questions or concerns I can address today?  Y/N  ______________      _________________________________________________________________________    Information provided during call :_________________________________________________  ___________________________________________________________________________    Call completed by:____________________________    Date:_________ Time:___________

## 2022-02-06 NOTE — DISCHARGE SUMMARY
Obstetric Discharge Summary    Admitting Diagnosis  IUP 40.5 weeks  OB History        1    Para   1    Term   1       0    AB   0    Living   1       SAB   0    IAB   0    Ectopic   0    Molar   0    Multiple   0    Live Births   1                Reasons for Admission on 2022  3:57 AM  Normal labor [O80, Z37.9]  No comment available  Onset of Labor    Prenatal Procedures  See ACOG    Intrapartum Procedures                 Spontaneous Vaginal Delivery: See Labor and Delivery Summary       Postpartum Procedures  None    Postpartum/Operative Complications        Data  Information for the patient's :  Sintia Cristina Girl Kishan Matos [[id number]]   female   Birth Weight: 6 lb 6.8 oz (2.914 kg)     Discharge With Mother  Complications: No    Discharge Diagnosis   Primip s/p -stable     Discharge Information  Current Discharge Medication List      STOP taking these medications       hydrOXYzine Pamoate (VISTARIL PO) Comments:   Reason for Stopping:         nitrofurantoin, macrocrystal-monohydrate, (MACROBID) 100 MG capsule Comments:   Reason for Stopping:         calcium carbonate (TUMS) 500 MG chewable tablet Comments:   Reason for Stopping:         Prenatal MV-Min-Fe Fum-FA-DHA (PRENATAL 1 PO) Comments:   Reason for Stopping:         famotidine (PEPCID) 20 MG tablet Comments:   Reason for Stopping:         docusate (COLACE) 50 MG/5ML liquid Comments:   Reason for Stopping:         NUVARING 0.12-0.015 MG/24HR vaginal ring Comments:   Reason for Stopping:         zolpidem (AMBIEN CR) 6.25 MG extended release tablet Comments:   Reason for Stopping:               No discharge procedures on file. Discharge to: Home  Follow up in 6 weeks at . Accompanied by .     Discharge Date: 22 Time:       Comments

## 2022-02-06 NOTE — FLOWSHEET NOTE
Postpartum and infant care teaching completed and forms signed by patient. Copy witnessed by RN and given to patient who  verbalized understanding of all teaching points and denied further questions. Patient plans to follow-up with [facility] Provider as instructed. ID bands checked. Father's ID band and one of baby's ID bands removed and taped to discharge instruction sheet, signed by patient and witnessed by RN. Patient discharged home in stable condition accompanied by family. Discharged via wheelchair, holding baby in a rear facing car seat.

## 2022-02-06 NOTE — PROGRESS NOTES
Department of Obstetrics and Gynecology  Labor and Delivery  CNM Post Partum Progress Note      SUBJECTIVE:  Doing well, no probs. Tolerating reg diet, voiding, ambulating, bottle-feeding improved. Pain controlled with po pain meds. Ready for home.      OBJECTIVE:      Vitals:  BP (!) 141/85   Pulse 65   Temp 98.4 °F (36.9 °C) (Oral)   Resp 18   Ht 5' 4\" (1.626 m)   Wt 242 lb (109.8 kg)   Breastfeeding Unknown   BMI 41.54 kg/m²     abd-soft, KEMAL Venegas@[employer]  Lochia-small, rubra  Perineum-neg edema    DATA:    No pending labs    ASSESSMENT:  Primip s/p   PP day 2  Stable female   Bottle-feeding         PLAN:    D/c to home today  Fu office 6 weeks      Active Problems:    Vaginal delivery  Plan:     Normal labor  Plan: